# Patient Record
Sex: FEMALE | Race: WHITE | NOT HISPANIC OR LATINO | Employment: FULL TIME | URBAN - METROPOLITAN AREA
[De-identification: names, ages, dates, MRNs, and addresses within clinical notes are randomized per-mention and may not be internally consistent; named-entity substitution may affect disease eponyms.]

---

## 2017-04-04 ENCOUNTER — OFFICE VISIT (OUTPATIENT)
Dept: URGENT CARE | Facility: RETAIL CLINIC | Age: 32
End: 2017-04-04
Payer: COMMERCIAL

## 2017-04-04 VITALS
TEMPERATURE: 99 F | DIASTOLIC BLOOD PRESSURE: 95 MMHG | OXYGEN SATURATION: 99 % | HEART RATE: 87 BPM | SYSTOLIC BLOOD PRESSURE: 134 MMHG

## 2017-04-04 DIAGNOSIS — J01.90 ACUTE SINUSITIS WITH COEXISTING CONDITION, NEED PROPHYLACTIC TREATMENT: Primary | ICD-10-CM

## 2017-04-04 PROCEDURE — 99213 OFFICE O/P EST LOW 20 MIN: CPT | Performed by: PHYSICIAN ASSISTANT

## 2017-04-04 RX ORDER — BIOTIN 10 MG
TABLET ORAL
COMMUNITY
End: 2020-02-19

## 2017-04-04 NOTE — NURSING NOTE
"Chief Complaint   Patient presents with     Otalgia     bilateral ear pain x 1 week, sinus congestion since last wednesday, no fevers     Cough     since last wednesday       Initial BP (!) 134/95  Pulse 87  Temp 99  F (37.2  C) (Temporal)  SpO2 99% Estimated body mass index is 23.21 kg/(m^2) as calculated from the following:    Height as of 9/26/16: 5' 3\" (1.6 m).    Weight as of 11/1/16: 131 lb (59.4 kg).  Medication Reconciliation: complete    "

## 2017-04-04 NOTE — PROGRESS NOTES
Chief Complaint   Patient presents with     Otalgia     bilateral ear pain x 1 week, sinus congestion since last wednesday, no fevers     Cough     since last wednesday     SUBJECTIVE:  Celina Calero is a 31 year old female here with concerns about sinus infection.  She states onset of symptoms was 7 days ago.    Course of illness is same.   Severity moderate  She has had maxillary pressure as well as nasal congestion, cough, ear pain bilaterally and post-nasal drainage  Predisposing factors include taking Humira.  Recent treatment has included: OTC meds- NyQuil    Past Medical History:   Diagnosis Date     Arthritis      Colitis      History of colposcopy with cervical biopsy 12/1/2009    bx- suggestive of koilocytosis.  ECC- negative     LSIL (low grade squamous intraepithelial lesion) on Pap smear 9/10/08     Ulcerative colitis, unspecified     Ulcerative colitis     Current Outpatient Prescriptions   Medication Sig Dispense Refill     Pseudoeph-Doxylamine-DM-APAP (NYQUIL PO)        Multiple Vitamins-Minerals (MULTIVITAMIN ADULT) CHEW        amoxicillin-clavulanate (AUGMENTIN) 875-125 MG per tablet Take 1 tablet by mouth 2 times daily for 10 days 20 tablet 0     etonogestrel-ethinyl estradiol (NUVARING) 0.12-0.015 MG/24HR vaginal ring Place 1 each vaginally every 28 days       adalimumab (HUMIRA) 40 MG/0.8ML injection Inject 40 mg Subcutaneous once       Acetaminophen (TYLENOL PO) Reported on 4/4/2017       Social History   Substance Use Topics     Smoking status: Never Smoker     Smokeless tobacco: Never Used     Alcohol use No     No Known Allergies  ROS:  Review of systems negative except as stated above.    OBJECTIVE:  BP (!) 134/95  Pulse 87  Temp 99  F (37.2  C) (Temporal)  SpO2 99%  GENERAL APPEARANCE: healthy, alert and no distress  EYES: PERRL, conjunctiva clear  HENT: Pain with palpation over frontal and maxillary sinuses. Ear canals normal TMs with mild serous effusions bilaterally. Nasal turbinates  edematous and boggy bilaterally. Posterior pharynx is not erythematous.  NECK: supple, nontender, no lymphadenopathy  RESP: lungs clear to auscultation - no rales, rhonchi or wheezes  CV: regular rates and rhythm, normal S1 S2, no murmur noted    ASSESSMENT:    ICD-10-CM    1. Acute sinusitis with coexisting condition, need prophylactic treatment J01.90 amoxicillin-clavulanate (AUGMENTIN) 875-125 MG per tablet     PLAN:   Patient Instructions   Augmentin (amoxicillin-clavulanate) 875mg twice daily for 10 days as directed.  DayQuil/NyQuil as needed.  Flonase 2 sprays in each nostril daily until symptoms resolve, then continue 1 spray in each nostril for at least 5 more days.  Take Tylenol or an NSAID such as ibuprofen or naproxen as needed for pain.  May use netti pot with bottled or distilled water and saline packets to flush sinuses.  Mucinex (guiafenesin) thins mucus and may help it to loosen more quickly  Saline drops or nasal sprays may loosen mucus.  Sit in the bathroom with the door closed and hot shower running to loosen mucus.  Contact primary care clinic if you do not have any relief from your symptoms after 10 days.  Present to emergency room for significantly increasing pain, persistent high fever >102F, swelling/redness around your eyes, changes in your vision or ability to move your eyes, altered mental status or a severe headache.    Follow up with primary care provider with any problems, questions or concerns or if symptoms worsen or fail to improve. Patient agreed to plan and verbalized understanding.    Andria Peacock PA-C  [facility] [place]

## 2017-04-04 NOTE — PATIENT INSTRUCTIONS
Augmentin (amoxicillin-clavulanate) 875mg twice daily for 10 days as directed.  DayQuil/NyQuil as needed.  Flonase 2 sprays in each nostril daily until symptoms resolve, then continue 1 spray in each nostril for at least 5 more days.  Take Tylenol or an NSAID such as ibuprofen or naproxen as needed for pain.  May use netti pot with bottled or distilled water and saline packets to flush sinuses.  Mucinex (guiafenesin) thins mucus and may help it to loosen more quickly  Saline drops or nasal sprays may loosen mucus.  Sit in the bathroom with the door closed and hot shower running to loosen mucus.  Contact primary care clinic if you do not have any relief from your symptoms after 10 days.  Present to emergency room for significantly increasing pain, persistent high fever >102F, swelling/redness around your eyes, changes in your vision or ability to move your eyes, altered mental status or a severe headache.

## 2017-04-04 NOTE — MR AVS SNAPSHOT
After Visit Summary   4/4/2017    Celina Calero    MRN: [id number]           Patient Information     Date Of Birth          1985        Visit Information        Provider Department      4/4/2017 9:00 AM Silvia Peacock PA-C [facility]        Today's Diagnoses     Acute sinusitis with coexisting condition, need prophylactic treatment    -  1      Care Instructions    Augmentin (amoxicillin-clavulanate) 875mg twice daily for 10 days as directed.  DayQuil/NyQuil as needed.  Flonase 2 sprays in each nostril daily until symptoms resolve, then continue 1 spray in each nostril for at least 5 more days.  Take Tylenol or an NSAID such as ibuprofen or naproxen as needed for pain.  May use netti pot with bottled or distilled water and saline packets to flush sinuses.  Mucinex (guiafenesin) thins mucus and may help it to loosen more quickly  Saline drops or nasal sprays may loosen mucus.  Sit in the bathroom with the door closed and hot shower running to loosen mucus.  Contact primary care clinic if you do not have any relief from your symptoms after 10 days.  Present to emergency room for significantly increasing pain, persistent high fever >102F, swelling/redness around your eyes, changes in your vision or ability to move your eyes, altered mental status or a severe headache.        Follow-ups after your visit        Who to contact     You can reach your care team any time of the day by calling [phone].  Notification of test results:  If you have an abnormal lab result, we will notify you by phone as soon as possible.         Additional Information About Your Visit        MyChart Information     [facility] gives you secure access to your electronic health record. If you see a primary care provider, you can also send messages to your care team and make appointments. If you have questions, please call your primary care clinic.  If you do not have a primary care provider, please call  [phone] and they will assist you.        Care EveryWhere ID     This is your Care EveryWhere ID. This could be used by other organizations to access your [facility] medical records  [id number]        Your Vitals Were     Pulse Temperature Pulse Oximetry             87 99  F (37.2  C) (Temporal) 99%          Blood Pressure from Last 3 Encounters:   04/04/17 (!) 134/95   11/01/16 120/72   09/26/16 154/90    Weight from Last 3 Encounters:   11/01/16 131 lb (59.4 kg)   09/26/16 131 lb (59.4 kg)   01/20/16 130 lb (59 kg)              Today, you had the following     No orders found for display         Today's Medication Changes          These changes are accurate as of: 4/4/17  9:34 AM.  If you have any questions, ask your nurse or doctor.               Start taking these medicines.        Dose/Directions    amoxicillin-clavulanate 875-125 MG per tablet   Commonly known as:  AUGMENTIN   Used for:  Acute sinusitis with coexisting condition, need prophylactic treatment        Dose:  1 tablet   Take 1 tablet by mouth 2 times daily for 10 days   Quantity:  20 tablet   Refills:  0            Where to get your medicines      These medications were sent to [facility] - [address]   [address] [place] [address]     Phone:  [phone]     amoxicillin-clavulanate 875-125 MG per tablet                Primary Care Provider Office Phone # Fax #    Stephanie Benton [phone]       [facility] [address]  [place] [address]        Thank you!     Thank you for choosing [facility]  for your care. Our goal is always to provide you with excellent care. Hearing back from our patients is one way we can continue to improve our services. Please take a few minutes to complete the written survey that you may receive in the mail after your visit with us. Thank you!             Your Updated Medication List - Protect others  around you: Learn how to safely use, store and throw away your medicines at www.disposemymeds.org.          This list is accurate as of: 4/4/17  9:34 AM.  Always use your most recent med list.                   Brand Name Dispense Instructions for use    adalimumab 40 MG/0.8ML injection    Humira     Inject 40 mg Subcutaneous once       amoxicillin-clavulanate 875-125 MG per tablet    AUGMENTIN    20 tablet    Take 1 tablet by mouth 2 times daily for 10 days       etonogestrel-ethinyl estradiol 0.12-0.015 MG/24HR vaginal ring    NUVARING     Place 1 each vaginally every 28 days       MULTIVITAMIN ADULT Chew          NYQUIL PO          TYLENOL PO      Reported on 4/4/2017

## 2017-04-25 ENCOUNTER — TELEPHONE (OUTPATIENT)
Dept: FAMILY MEDICINE | Facility: OTHER | Age: 32
End: 2017-04-25

## 2017-04-25 NOTE — TELEPHONE ENCOUNTER
Summary:    Patient is due/failing the following:   PAP  However if patient completes outside of [facility] please ask when, where and the results to update her records.   Action needed:   Patient needs office visit for PAP.    Type of outreach:    Phone, left message for patient to call back.     Questions for provider review:    None                                                                                                                                    Maryjo Sheets       Chart routed to Care Team .        Panel Management Review      Patient has the following on her problem list: None      Composite cancer screening  Chart review shows that this patient is due/due soon for the following Pap Smear

## 2017-04-25 NOTE — LETTER
[facility]  [address]   [place] [address]  Phone: [phone]  May 1, 2017      Celina Calero  [address] [place] [address]      Dear Celina,    We care about your health and have reviewed your health plan including your medical conditions, medications, and lab results.  Based on this review, it is recommended that you follow up regarding the following health topic(s):  -Cervical Cancer Screening    We recommend you take the following action(s):  -schedule a PAP SMEAR EXAM which is due.  Please disregard this reminder if you have had this exam elsewhere within the last year.  It would be helpful for us to have a copy of your recent pap smear report to update your records.     Please call us at the [facility] - [phone] (or use [employer]) to address the above recommendations.     Thank you for trusting [facility] and we appreciate the opportunity to serve you.  We look forward to supporting your healthcare needs in the future.    Healthy Regards,    Your Health Care Team  [facility]

## 2017-05-26 ENCOUNTER — HEALTH MAINTENANCE LETTER (OUTPATIENT)
Age: 32
End: 2017-05-26

## 2017-08-17 ENCOUNTER — TELEPHONE (OUTPATIENT)
Dept: FAMILY MEDICINE | Facility: OTHER | Age: 32
End: 2017-08-17

## 2017-08-17 NOTE — TELEPHONE ENCOUNTER
Summary:    Patient is due/failing the following:   PAP    Action needed:   Patient needs office visit for PAP.    Type of outreach:    Phone, left message for patient to call back.     Questions for provider review:    None                                                                                                                                    Maryjo Sheets       Chart routed to Care Team .          Panel Management Review      Patient has the following on her problem list: None      Composite cancer screening  Chart review shows that this patient is due/due soon for the following Pap Smear

## 2018-06-01 ENCOUNTER — HEALTH MAINTENANCE LETTER (OUTPATIENT)
Age: 33
End: 2018-06-01

## 2018-11-07 ENCOUNTER — OFFICE VISIT (OUTPATIENT)
Dept: URGENT CARE | Facility: RETAIL CLINIC | Age: 33
End: 2018-11-07
Payer: COMMERCIAL

## 2018-11-07 VITALS — DIASTOLIC BLOOD PRESSURE: 85 MMHG | SYSTOLIC BLOOD PRESSURE: 133 MMHG | HEART RATE: 102 BPM | TEMPERATURE: 99.8 F

## 2018-11-07 DIAGNOSIS — J02.0 STREP THROAT: Primary | ICD-10-CM

## 2018-11-07 DIAGNOSIS — J02.9 ACUTE PHARYNGITIS, UNSPECIFIED ETIOLOGY: ICD-10-CM

## 2018-11-07 LAB — S PYO AG THROAT QL IA.RAPID: POSITIVE

## 2018-11-07 PROCEDURE — 87880 STREP A ASSAY W/OPTIC: CPT | Mod: QW | Performed by: PHYSICIAN ASSISTANT

## 2018-11-07 PROCEDURE — 99213 OFFICE O/P EST LOW 20 MIN: CPT | Performed by: PHYSICIAN ASSISTANT

## 2018-11-07 RX ORDER — PENICILLIN V POTASSIUM 500 MG/1
500 TABLET, FILM COATED ORAL 2 TIMES DAILY
Qty: 20 TABLET | Refills: 0 | Status: SHIPPED | OUTPATIENT
Start: 2018-11-07 | End: 2019-02-06

## 2018-11-07 NOTE — PROGRESS NOTES
Chief Complaint   Patient presents with     Otalgia     bilateral ear pain since yesterday morning, fevers around 101 highest, chills and body aches     Pharyngitis     since yesterday morning     SUBJECTIVE:  Celina Calero is a 33 year old female with a chief complaint of sore throat.  Onset of symptoms was 1 day(s) ago.    Course of illness: sudden onset.  Severity moderate  Current and Associated symptoms: sore throat, ear pain, fever up to 101, chills, body aches  Treatment measures tried include taking tylenol  Predisposing factors include has UC and RA on Humira    Past Medical History:   Diagnosis Date     Arthritis      Colitis      History of colposcopy with cervical biopsy 12/1/2009    bx- suggestive of koilocytosis.  ECC- negative     LSIL (low grade squamous intraepithelial lesion) on Pap smear 9/10/08     Ulcerative colitis, unspecified     Ulcerative colitis     Current Outpatient Prescriptions   Medication Sig Dispense Refill     Acetaminophen (TYLENOL PO) Reported on 4/4/2017       adalimumab (HUMIRA) 40 MG/0.8ML injection Inject 40 mg Subcutaneous once       levonorgestrel (MIRENA) 20 MCG/24HR IUD 1 each by Intrauterine route once       penicillin V potassium (VEETID) 500 MG tablet Take 1 tablet (500 mg) by mouth 2 times daily for 10 days 20 tablet 0     Multiple Vitamins-Minerals (MULTIVITAMIN ADULT) CHEW        Pseudoeph-Doxylamine-DM-APAP (NYQUIL PO)         No Known Allergies     History   Smoking Status     Never Smoker   Smokeless Tobacco     Never Used       ROS:  CONSTITUTIONAL:POSITIVE  for fever, chills, body aches   ENT/MOUTH: POSITIVE for sore throat and ear pain NEGATIVE for nasal congestion  RESP:NEGATIVE for significant cough or wheezing    OBJECTIVE:   /85 (BP Location: Left arm)  Pulse 102  Temp 99.8  F (37.7  C) (Oral)  GENERAL APPEARANCE: mildly ill appearing  EYES: conjunctiva clear  HENT: ear canals and TM's normal.  Nose normal.  Pharynx erythematous with no exudate  noted.  NECK: supple, tender to palpation, small adenopathy noted  RESP: lungs clear to auscultation - no rales, rhonchi or wheezes  CV: regular rates and rhythm, normal S1 S2, no murmur noted  SKIN: no suspicious lesions or rashes    Rapid Strep test is positive    ASSESSMENT:     Acute pharyngitis, unspecified etiology  Strep throat    PLAN:   Plan: penicillin V potassium (VEETID) 500 MG tablet  Take antibiotic as directed and finish entire course.  Change toothbrush after at least 24 hours of starting antibiotics.   Will be contagious for 24 hours after starting antibiotic  May return to work/activities 24 hours after antibiotics are started  Symptomatic treat with fluids, rest, acetaminophen or ibuprofen as needed.   Please follow up with primary care provider if not improving, worsening or new symptoms or for any adverse reactions to medications.      [facility]

## 2018-11-07 NOTE — MR AVS SNAPSHOT
After Visit Summary   11/7/2018    Celina Calero    MRN: [id number]           Patient Information     Date Of Birth          1985        Visit Information        Provider Department      11/7/2018 3:50 PM Madison Teague PA-C [facility]        Today's Diagnoses     Strep throat    -  1    Acute pharyngitis, unspecified etiology          Care Instructions    Take antibiotic as directed and finish entire course.  Change toothbrush after at least 24 hours of starting antibiotics.   Will be contagious for 24 hours after starting antibiotic  May return to work/activities 24 hours after antibiotics are started  Symptomatic treat with fluids, rest, acetaminophen or ibuprofen as needed.   Please follow up with primary care provider if not improving, worsening or new symptoms or for any adverse reactions to medications.            Follow-ups after your visit        Who to contact     You can reach your care team any time of the day by calling [phone].  Notification of test results:  If you have an abnormal lab result, we will notify you by phone as soon as possible.         Additional Information About Your Visit        MyChart Information     [facility] gives you secure access to your electronic health record. If you see a primary care provider, you can also send messages to your care team and make appointments. If you have questions, please call your primary care clinic.  If you do not have a primary care provider, please call [phone] and they will assist you.        Care EveryWhere ID     This is your Care EveryWhere ID. This could be used by other organizations to access your [place] medical records  [id number]        Your Vitals Were     Pulse Temperature                102 99.8  F (37.7  C) (Oral)           Blood Pressure from Last 3 Encounters:   11/07/18 133/85   04/04/17 (!) 134/95   11/01/16 120/72    Weight from Last 3 Encounters:   11/01/16 131 lb (59.4 kg)    09/26/16 131 lb (59.4 kg)   01/20/16 130 lb (59 kg)              We Performed the Following     RAPID STREP SCREEN          Today's Medication Changes          These changes are accurate as of 11/7/18  4:09 PM.  If you have any questions, ask your nurse or doctor.               Start taking these medicines.        Dose/Directions    penicillin V potassium 500 MG tablet   Commonly known as:  VEETID   Used for:  Strep throat        Dose:  500 mg   Take 1 tablet (500 mg) by mouth 2 times daily for 10 days   Quantity:  20 tablet   Refills:  0            Where to get your medicines      These medications were sent to [id number] - [place], [place] - [address]  [address] [facility] [address]     Phone:  [phone]     penicillin V potassium 500 MG tablet                Primary Care Provider Office Phone # Fax #    Stephanie Benton [phone]       [address]  [place] [address]        Equal Access to Services     [facility] CenterYECENIA : Hadii feliciano escobar hadasho Soomaali, waaxda luqadaha, qaybta kaalmada adeegyada, caty singh . So [facility] [phone].    ATENCIÓN: Si habla español, tiene a mars disposición servicios gratuitos de asistencia lingüística. Llame al [phone].    We comply with applicable federal civil rights laws and [place] laws. We do not discriminate on the basis of race, color, national origin, age, disability, sex, sexual orientation, or gender identity.            Thank you!     Thank you for choosing [facility]  for your care. Our goal is always to provide you with excellent care. Hearing back from our patients is one way we can continue to improve our services. Please take a few minutes to complete the written survey that you may receive in the mail after your visit with us. Thank you!             Your Updated Medication List - Protect others around you: Learn how to safely use, store and throw away your  medicines at www.disposemymeds.org.          This list is accurate as of 11/7/18  4:09 PM.  Always use your most recent med list.                   Brand Name Dispense Instructions for use Diagnosis    adalimumab 40 MG/0.8ML injection    Humira     Inject 40 mg Subcutaneous once        levonorgestrel 20 MCG/24HR IUD    MIRENA     1 each by Intrauterine route once        MULTIVITAMIN ADULT Chew           NYQUIL PO           penicillin V potassium 500 MG tablet    VEETID    20 tablet    Take 1 tablet (500 mg) by mouth 2 times daily for 10 days    Strep throat       TYLENOL PO      Reported on 4/4/2017

## 2019-02-06 ENCOUNTER — ANCILLARY PROCEDURE (OUTPATIENT)
Dept: GENERAL RADIOLOGY | Facility: CLINIC | Age: 34
End: 2019-02-06
Attending: PHYSICIAN ASSISTANT
Payer: COMMERCIAL

## 2019-02-06 ENCOUNTER — OFFICE VISIT (OUTPATIENT)
Dept: FAMILY MEDICINE | Facility: CLINIC | Age: 34
End: 2019-02-06
Payer: COMMERCIAL

## 2019-02-06 VITALS
HEIGHT: 63 IN | WEIGHT: 125.6 LBS | DIASTOLIC BLOOD PRESSURE: 80 MMHG | SYSTOLIC BLOOD PRESSURE: 118 MMHG | BODY MASS INDEX: 22.25 KG/M2 | HEART RATE: 75 BPM | RESPIRATION RATE: 14 BRPM | OXYGEN SATURATION: 99 % | TEMPERATURE: 98.4 F

## 2019-02-06 DIAGNOSIS — M47.819 SPONDYLOARTHROPATHY: ICD-10-CM

## 2019-02-06 DIAGNOSIS — K51.90 ULCERATIVE COLITIS WITHOUT COMPLICATIONS, UNSPECIFIED LOCATION (H): ICD-10-CM
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
## 2019-02-06 DIAGNOSIS — S49.90XA SHOULDER INJURY, INITIAL ENCOUNTER: ICD-10-CM

## 2019-02-06 DIAGNOSIS — S49.90XA SHOULDER INJURY, INITIAL ENCOUNTER: Primary | ICD-10-CM

## 2019-02-06 PROCEDURE — 73000 X-RAY EXAM OF COLLAR BONE: CPT | Mod: LT

## 2019-02-06 PROCEDURE — 99214 OFFICE O/P EST MOD 30 MIN: CPT | Performed by: PHYSICIAN ASSISTANT

## 2019-02-06 PROCEDURE — 73030 X-RAY EXAM OF SHOULDER: CPT | Mod: LT

## 2019-02-06 ASSESSMENT — PAIN SCALES - GENERAL: PAINLEVEL: SEVERE PAIN (7)

## 2019-02-06 ASSESSMENT — MIFFLIN-ST. JEOR: SCORE: 1238.85

## 2019-02-06 NOTE — PATIENT INSTRUCTIONS
Acetaminophen 1000mg every 8 hrs as needed  Ice or heat     Gentle stretching    Will call radiology over read and plan if abnormal
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
## 2019-09-28 ENCOUNTER — HEALTH MAINTENANCE LETTER (OUTPATIENT)
Age: 34
End: 2019-09-28

## 2020-02-19 ENCOUNTER — OFFICE VISIT (OUTPATIENT)
Dept: FAMILY MEDICINE | Facility: OTHER | Age: 35
End: 2020-02-19
Payer: COMMERCIAL

## 2020-02-19 VITALS
HEART RATE: 76 BPM | OXYGEN SATURATION: 100 % | TEMPERATURE: 99 F | BODY MASS INDEX: 23.03 KG/M2 | WEIGHT: 130 LBS | SYSTOLIC BLOOD PRESSURE: 124 MMHG | DIASTOLIC BLOOD PRESSURE: 86 MMHG | RESPIRATION RATE: 16 BRPM

## 2020-02-19 DIAGNOSIS — R22.9 SKIN NODULE: ICD-10-CM

## 2020-02-19 DIAGNOSIS — R59.0 POSTERIOR CERVICAL LYMPHADENOPATHY: Primary | ICD-10-CM

## 2020-02-19 PROCEDURE — 99214 OFFICE O/P EST MOD 30 MIN: CPT | Performed by: NURSE PRACTITIONER

## 2020-02-19 RX ORDER — ADALIMUMAB 40MG/0.4ML
0.4 KIT SUBCUTANEOUS
COMMUNITY
Start: 2020-02-18

## 2020-02-19 ASSESSMENT — ENCOUNTER SYMPTOMS
PSYCHIATRIC NEGATIVE: 1
CARDIOVASCULAR NEGATIVE: 1
RESPIRATORY NEGATIVE: 1
CONSTITUTIONAL NEGATIVE: 1
NEUROLOGICAL NEGATIVE: 1

## 2020-02-19 ASSESSMENT — PAIN SCALES - GENERAL: PAINLEVEL: NO PAIN (0)

## 2020-02-19 NOTE — PATIENT INSTRUCTIONS
- Ultrasound as scheduled  - Continue to monitor symptoms.   - If any worsening symptoms, redness, swelling or fevers go in to be seen.       ANAI Odell CNP  Questions or concerns please feel free to send me a [email or web address] message or call me  Phone : [phone]

## 2020-02-19 NOTE — PROGRESS NOTES
Yamil Calero is a 34 year old female who presents to clinic today for the following health issues:    HPI   Lump on back of neck on patient's right.  She noticed this yesterday.  Not really tender but radiates down a little, not red or swollen.    She denies any fevers or chills  Area is tender. No skin changes  No recent illnesses   No recent travel  No URI symptoms or concerns  She has had a couple night sweats  No unintentional weight loss.       Social History     Tobacco Use     Smoking status: Never Smoker     Smokeless tobacco: Never Used   Substance Use Topics     Alcohol use: Yes     Comment: 3 drinks per week         Current Outpatient Medications   Medication Sig Dispense Refill     HUMIRA *CF* PEN 40 MG/0.4ML SC pen kit 0.4 mLs       levonorgestrel (MIRENA) 20 MCG/24HR IUD 1 each by Intrauterine route once       Acetaminophen (TYLENOL PO) Reported on 4/4/2017         Reviewed and updated as needed this visit by Provider         Review of Systems   Constitutional: Negative.    Respiratory: Negative.    Cardiovascular: Negative.    Neurological: Negative.    Psychiatric/Behavioral: Negative.             Objective    /86   Pulse 76   Temp 99  F (37.2  C)   Resp 16   Wt 59 kg (130 lb)   SpO2 100%   BMI 23.03 kg/m    Body mass index is 23.03 kg/m .  Physical Exam  Lymphadenopathy:      Head:      Right side of head: No submental, submandibular, tonsillar, preauricular, posterior auricular or occipital adenopathy.      Left side of head: No submental, submandibular, tonsillar, preauricular, posterior auricular or occipital adenopathy.      Cervical: Cervical adenopathy present.      Right cervical: Posterior cervical adenopathy (tenderness at area small pimple just distal to this region. No redness or skin changes. ) present. No superficial or deep cervical adenopathy.     Left cervical: No superficial, deep or posterior cervical adenopathy.      Upper Body:      Right upper body:  Supraclavicular adenopathy present.   Skin:     Comments: See lymphadenopathy              Diagnostic Test Results:  none         Assessment & Plan       ICD-10-CM    1. Posterior cervical lymphadenopathy R59.0 US Head Neck Soft Tissue   2. Skin nodule R22.9 US Head Neck Soft Tissue     New swelling and lump. Tender and patient very concerned about this. Given location and swelling I am concerned that this could be some lymphadenopathy vs a cyst within the lymph node. I would like her to get an ultra sound. We discussed monitoring for a month and then doing imaging. Patient preferred imaging at this time.     See HPI for pertinent history.     Follow up depending on results.     The patient indicates understanding of these issues and agrees with the plan.    Patient Instructions   - Ultrasound as scheduled  - Continue to monitor symptoms.   - If any worsening symptoms, redness, swelling or fevers go in to be seen.       ANAI Odell CNP  Questions or concerns please feel free to send me a [employer] message or call me  Phone : [phone]          No follow-ups on file.    ANAI Odell CNP  [facility]

## 2020-02-20 ENCOUNTER — HOSPITAL ENCOUNTER (OUTPATIENT)
Dept: ULTRASOUND IMAGING | Facility: CLINIC | Age: 35
Discharge: HOME | End: 2020-02-20
Attending: NURSE PRACTITIONER | Admitting: NURSE PRACTITIONER
Payer: COMMERCIAL

## 2020-02-20 DIAGNOSIS — R22.9 SKIN NODULE: ICD-10-CM

## 2020-02-20 DIAGNOSIS — R59.0 POSTERIOR CERVICAL LYMPHADENOPATHY: ICD-10-CM

## 2020-02-20 PROCEDURE — 76536 US EXAM OF HEAD AND NECK: CPT

## 2020-03-02 ENCOUNTER — MYC MEDICAL ADVICE (OUTPATIENT)
Dept: FAMILY MEDICINE | Facility: OTHER | Age: 35
End: 2020-03-02

## 2020-03-03 NOTE — PROGRESS NOTES
"Subjective     Celina Calero is a 34 year old female who presents to clinic today for the following health issues:    HPI   Concern - pain  Onset: about a week - started a couple days after having a colonoscopy     Description:   Reports a sharp/stabbing pain in left groin, mild pain in right groin, bilateral armpit tenderness, low back pain, and breast tenderness    Intensity: moderate    Progression of Symptoms:  Becoming more consistent     Accompanying Signs & Symptoms:  none    Previous history of similar problem:   yes    Precipitating factors:   Worsened by: none    Alleviating factors:  Improved by: none    Therapies Tried and outcome: none     Saw me last month for lump on the back of her neck  On Saturday she had stabbing pain in the left groin area. Come and go. There is a lump in this area. She had a colonoscopy last Friday.   Sore under her armpits.   Breasts tender.   Low back pain this weekend, this comes and goes.   Current Outpatient Medications   Medication Sig Dispense Refill     Acetaminophen (TYLENOL PO) Reported on 4/4/2017       HUMIRA *CF* PEN 40 MG/0.4ML SC pen kit 0.4 mLs       levonorgestrel (MIRENA) 20 MCG/24HR IUD 1 each by Intrauterine route once         Reviewed and updated as needed this visit by Provider         Review of Systems         Objective    /82 (BP Location: Right arm, Patient Position: Chair, Cuff Size: Adult Regular)   Pulse 85   Temp 99  F (37.2  C) (Temporal)   Resp 16   Ht 1.6 m (5' 3\")   Wt 58.1 kg (128 lb)   SpO2 99%   BMI 22.67 kg/m    Body mass index is 22.67 kg/m .  Physical Exam  Cardiovascular:      Rate and Rhythm: Normal rate and regular rhythm.   Pulmonary:      Effort: Pulmonary effort is normal.      Breath sounds: Normal breath sounds and air entry.   Lymphadenopathy:      Head:      Right side of head: No submental, submandibular, tonsillar, preauricular, posterior auricular or occipital adenopathy.      Left side of head: No submental, " submandibular, tonsillar, preauricular, posterior auricular or occipital adenopathy.      Cervical: No cervical adenopathy.      Right cervical: No superficial, deep or posterior cervical adenopathy.     Left cervical: No superficial, deep or posterior cervical adenopathy.      Upper Body:      Right upper body: No supraclavicular or axillary adenopathy.      Left upper body: No supraclavicular or axillary adenopathy.      Lower Body: Left inguinal adenopathy (small lump along the left inguinal region) present.   Skin:     General: Skin is warm.   Neurological:      Mental Status: She is alert.   Psychiatric:         Behavior: Behavior is cooperative.            Diagnostic Test Results:  none         Assessment & Plan       ICD-10-CM    1. Pelvic lymphadenopathy  R59.0 *UA reflex to Microscopic and Culture ([place] [facility] (except [place] and [place])     CBC with platelets and differential     US Pelvic Complete w Transvaginal     US Extremity Non Vascular Bilateral   2. Armpit pain, unspecified laterality  M79.629    3. Inguinal lymphadenopathy  R59.0 US Extremity Non Vascular Bilateral      Patient here today with concerns for some inguinal lymphadenopathy and pelvic pain. She notes that she developed this last Saturday and noticed the lump as well. Had a colonoscopy last Friday. She is also has had some soreness under her armpits. Overall concerned if this is an acute illness or additional concerns. She denies any fever or chills. UA is essentially normal. Denies any vaginal discharge. CBC normal. At this time we discussed monitoring vs imaging evaluation.   Imaging requested, will get this. I recommend she continue to monitor her symptoms, certainly if any worsening symptoms she return to clinic or go in to be evaluated.   The patient indicates understanding of these issues and agrees with the plan.        The patient indicates understanding of these issues and agrees with the plan.    Patient  Instructions   - Plans for imaging  - Recommend follow up depending on results  - If any worsening symptoms or concerns please go in to be evaluated.       ANAI Odell CNP  Questions or concerns please feel free to send me a [employer] message or call me  Phone : [phone]          No follow-ups on file.    ANAI Odell CNP  [facility]

## 2020-03-04 ENCOUNTER — OFFICE VISIT (OUTPATIENT)
Dept: FAMILY MEDICINE | Facility: OTHER | Age: 35
End: 2020-03-04
Payer: COMMERCIAL

## 2020-03-04 VITALS
BODY MASS INDEX: 22.68 KG/M2 | SYSTOLIC BLOOD PRESSURE: 124 MMHG | OXYGEN SATURATION: 99 % | HEIGHT: 63 IN | TEMPERATURE: 99 F | WEIGHT: 128 LBS | RESPIRATION RATE: 16 BRPM | DIASTOLIC BLOOD PRESSURE: 82 MMHG | HEART RATE: 85 BPM

## 2020-03-04 DIAGNOSIS — M79.629 ARMPIT PAIN, UNSPECIFIED LATERALITY: ICD-10-CM

## 2020-03-04 DIAGNOSIS — R59.0 PELVIC LYMPHADENOPATHY: Primary | ICD-10-CM

## 2020-03-04 DIAGNOSIS — R59.0 INGUINAL LYMPHADENOPATHY: ICD-10-CM

## 2020-03-04 LAB
ALBUMIN UR-MCNC: NEGATIVE MG/DL
APPEARANCE UR: CLEAR
BASOPHILS # BLD AUTO: 0 10E9/L (ref 0–0.2)
BASOPHILS NFR BLD AUTO: 0.3 %
BILIRUB UR QL STRIP: NEGATIVE
COLOR UR AUTO: YELLOW
DIFFERENTIAL METHOD BLD: NORMAL
EOSINOPHIL # BLD AUTO: 0.2 10E9/L (ref 0–0.7)
EOSINOPHIL NFR BLD AUTO: 2.3 %
ERYTHROCYTE [DISTWIDTH] IN BLOOD BY AUTOMATED COUNT: 12.6 % (ref 10–15)
GLUCOSE UR STRIP-MCNC: NEGATIVE MG/DL
HCT VFR BLD AUTO: 39.7 % (ref 35–47)
HGB BLD-MCNC: 13.1 G/DL (ref 11.7–15.7)
HGB UR QL STRIP: NEGATIVE
KETONES UR STRIP-MCNC: NEGATIVE MG/DL
LEUKOCYTE ESTERASE UR QL STRIP: NEGATIVE
LYMPHOCYTES # BLD AUTO: 3 10E9/L (ref 0.8–5.3)
LYMPHOCYTES NFR BLD AUTO: 29.2 %
MCH RBC QN AUTO: 30.2 PG (ref 26.5–33)
MCHC RBC AUTO-ENTMCNC: 33 G/DL (ref 31.5–36.5)
MCV RBC AUTO: 92 FL (ref 78–100)
MONOCYTES # BLD AUTO: 1 10E9/L (ref 0–1.3)
MONOCYTES NFR BLD AUTO: 9.5 %
NEUTROPHILS # BLD AUTO: 6.1 10E9/L (ref 1.6–8.3)
NEUTROPHILS NFR BLD AUTO: 58.7 %
NITRATE UR QL: NEGATIVE
PH UR STRIP: 7 PH (ref 5–7)
PLATELET # BLD AUTO: 269 10E9/L (ref 150–450)
RBC # BLD AUTO: 4.34 10E12/L (ref 3.8–5.2)
SOURCE: NORMAL
SP GR UR STRIP: 1.01 (ref 1–1.03)
UROBILINOGEN UR STRIP-ACNC: 0.2 EU/DL (ref 0.2–1)
WBC # BLD AUTO: 10.3 10E9/L (ref 4–11)

## 2020-03-04 PROCEDURE — 81003 URINALYSIS AUTO W/O SCOPE: CPT | Performed by: NURSE PRACTITIONER

## 2020-03-04 PROCEDURE — 36415 COLL VENOUS BLD VENIPUNCTURE: CPT | Performed by: NURSE PRACTITIONER

## 2020-03-04 PROCEDURE — 99214 OFFICE O/P EST MOD 30 MIN: CPT | Performed by: NURSE PRACTITIONER

## 2020-03-04 PROCEDURE — 85025 COMPLETE CBC W/AUTO DIFF WBC: CPT | Performed by: NURSE PRACTITIONER

## 2020-03-04 ASSESSMENT — MIFFLIN-ST. JEOR: SCORE: 1244.73

## 2020-03-04 NOTE — PATIENT INSTRUCTIONS
- Plans for imaging  - Recommend follow up depending on results  - If any worsening symptoms or concerns please go in to be evaluated.       ANAI Odell CNP  Questions or concerns please feel free to send me a [employer] message or call me  Phone : [phone]

## 2020-03-05 ENCOUNTER — ANCILLARY PROCEDURE (OUTPATIENT)
Dept: ULTRASOUND IMAGING | Facility: CLINIC | Age: 35
End: 2020-03-05
Attending: NURSE PRACTITIONER
Payer: COMMERCIAL

## 2020-03-05 DIAGNOSIS — R59.0 PELVIC LYMPHADENOPATHY: ICD-10-CM

## 2020-03-05 DIAGNOSIS — R59.0 INGUINAL LYMPHADENOPATHY: ICD-10-CM

## 2020-03-05 PROCEDURE — 76882 US LMTD JT/FCL EVL NVASC XTR: CPT | Mod: RT

## 2020-03-05 PROCEDURE — 76830 TRANSVAGINAL US NON-OB: CPT

## 2020-03-05 PROCEDURE — 76856 US EXAM PELVIC COMPLETE: CPT | Mod: 59

## 2020-08-20 ENCOUNTER — VIRTUAL VISIT (OUTPATIENT)
Dept: FAMILY MEDICINE | Facility: OTHER | Age: 35
End: 2020-08-20
Payer: COMMERCIAL

## 2020-08-20 DIAGNOSIS — Z20.822 SUSPECTED 2019 NOVEL CORONAVIRUS INFECTION: Primary | ICD-10-CM

## 2020-08-20 DIAGNOSIS — Z20.822 SUSPECTED 2019 NOVEL CORONAVIRUS INFECTION: ICD-10-CM

## 2020-08-20 PROCEDURE — 99421 OL DIG E/M SVC 5-10 MIN: CPT | Performed by: PHYSICIAN ASSISTANT

## 2020-08-20 PROCEDURE — U0003 INFECTIOUS AGENT DETECTION BY NUCLEIC ACID (DNA OR RNA); SEVERE ACUTE RESPIRATORY SYNDROME CORONAVIRUS 2 (SARS-COV-2) (CORONAVIRUS DISEASE [COVID-19]), AMPLIFIED PROBE TECHNIQUE, MAKING USE OF HIGH THROUGHPUT TECHNOLOGIES AS DESCRIBED BY CMS-2020-01-R: HCPCS | Performed by: FAMILY MEDICINE

## 2020-08-20 NOTE — PROGRESS NOTES
"Date: 2020 08:55:52  Clinician: Ricardo Brooks  Clinician NPI: [id number]  Patient: Celina Calero  Patient : 1985  Patient Address: [address] [place] [address]  Patient Phone: [phone]  Visit Protocol: URI  Patient Summary:  Celina is a 34 year old ( : 1985 ) female who initiated a Visit for COVID-19 (Coronavirus) evaluation and screening. When asked the question \"Please sign me up to receive news, health information and promotions from [employer]", Celina responded \"No\".    Celina states her symptoms started 1-2 days ago.   Her symptoms consist of ear pain, malaise, a sore throat, a cough, and nasal congestion.   Symptom details     Nasal secretions: The color of her mucus is white.    Cough: Celina coughs a few times an hour and her cough is not more bothersome at night. Phlegm comes into her throat when she coughs. She believes her cough is caused by post-nasal drip. The color of the phlegm is clear.     Sore throat: Celina reports having mild throat pain (1-3 on a 10 point pain scale), does not have exudate on her tonsils, and can swallow liquids. The lymph nodes in her neck are not enlarged. A rash has not appeared on the skin since the sore throat started.      Celina denies having headache, chills, enlarged lymph nodes, fever, wheezing, teeth pain, ageusia, diarrhea, vomiting, rhinitis, nausea, myalgias, anosmia, and facial pain or pressure. She also denies having recent facial or sinus surgery in the past 60 days and taking antibiotic medication in the past month. She is not experiencing dyspnea.   Precipitating events  Within the past week, Celina has not been exposed to someone with strep throat. She has not recently been exposed to someone with influenza. Celina has been in close contact with the following high risk individuals: people with asthma, heart disease or diabetes.   Pertinent COVID-19 (Coronavirus) information  In the past 14 days, Celina has not worked in a congregate living " setting.   She does not work or volunteer as healthcare worker or a  and does not work or volunteer in a healthcare facility.   Celina also has not lived in a congregate living setting in the past 14 days. She does not live with a healthcare worker.   Celina has had a close contact with a laboratory-confirmed COVID-19 patient within 14 days of symptom onset.   Since December 2019, Celina and has not had upper respiratory infection or influenza-like illness. Has not been diagnosed with lab-confirmed COVID-19 test   Pertinent medical history  Celina does not get yeast infections when she takes antibiotics.   Celina needs a return to work/school note.   Weight: 123 lbs   Celina does not smoke or use smokeless tobacco.   She denies pregnancy and denies breastfeeding. She does not menstruate.   Additional information as reported by the patient (free text): I have RA and colitis - my last dose of humira was this past Saturday.   Weight: 123 lbs    MEDICATIONS: Humira Pen subcutaneous, ALLERGIES: NKDA  Clinician Response:  Dear Celina,   Your symptoms show that you may have coronavirus (COVID-19). This illness can cause fever, cough and trouble breathing. Many people get a mild case and get better on their own. Some people can get very sick.  What should I do?  We would like to test you for this virus.   1. Please call [phone] to schedule your visit. Explain that you were referred by [facility] to have a COVID-19 test. Be ready to share [facility].  The following will serve as your written order for this COVID Test, ordered by me, for the indication of suspected COVID [Z20.828]: The test will be ordered in [employer], our electronic health record, after you are scheduled. It will show as ordered and authorized by Brian Mendoza MD.  Order: COVID-19 (Coronavirus) PCR for SYMPTOMATIC testing from [facility].      2. When it's time for your COVID test:  Stay at least 6 feet away from others. (If someone will drive you  "to your test, stay in the backseat, as far away from the  as you can.)   Cover your mouth and nose with a mask, tissue or washcloth.  Go straight to the testing site. Don't make any stops on the way there or back.      3.Starting now: Stay home and away from others (self-isolate) until:   You've had no fever---and no medicine that reduces fever---for one full day (24 hours). And...   Your other symptoms have gotten better. For example, your cough or breathing has improved. And...   At least 10 days have passed since your symptoms started.       During this time, don't leave the house except for testing or medical care.   Stay in your own room, even for meals. Use your own bathroom if you can.   Stay away from others in your home. No hugging, kissing or shaking hands. No visitors.  Don't go to work, school or anywhere else.    Clean \"high touch\" surfaces often (doorknobs, counters, handles, etc.). Use a household cleaning spray or wipes. You'll find a full list of  on the EPA website: www.epa.gov/pesticide-registration/list-n-disinfectants-use-against-sars-cov-2.   Cover your mouth and nose with a mask, tissue or washcloth to avoid spreading germs.  Wash your hands and face often. Use soap and water.  Caregivers in these groups are at risk for severe illness due to COVID-19:  o People 65 years and older  o People who live in a nursing home or long-term care facility  o People with chronic disease (lung, heart, cancer, diabetes, kidney, liver, immunologic)  o People who have a weakened immune system, including those who:   Are in cancer treatment  Take medicine that weakens the immune system, such as corticosteroids  Had a bone marrow or organ transplant  Have an immune deficiency  Have poorly controlled HIV or AIDS  Are obese (body mass index of 40 or higher)  Smoke regularly   o Caregivers should wear gloves while washing dishes, handling laundry and cleaning bedrooms and bathrooms.  o Use caution when " washing and drying laundry: Don't shake dirty laundry, and use the warmest water setting that you can.  o For more tips, go to www.cdc.gov/coronavirus/2019-ncov/downloads/10Things.pdf.    4.Sign up for [employer]. We know it's scary to hear that you might have COVID-19. We want to track your symptoms to make sure you're okay over the next 2 weeks. Please look for an email from [employer]---this is a free, online program that we'll use to keep in touch. To sign up, follow the link in the email. Learn more at [email or web address]  How can I take care of myself?   Get lots of rest. Drink extra fluids (unless a doctor has told you not to).   Take Tylenol (acetaminophen) for fever or pain. If you have liver or kidney problems, ask your family doctor if it's okay to take Tylenol.   Adults can take either:    650 mg (two 325 mg pills) every 4 to 6 hours, or...   1,000 mg (two 500 mg pills) every 8 hours as needed.    Note: Don't take more than 3,000 mg in one day. Acetaminophen is found in many medicines (both prescribed and over-the-counter medicines). Read all labels to be sure you don't take too much.   For children, check the Tylenol bottle for the right dose. The dose is based on the child's age or weight.    If you have other health problems (like cancer, heart failure, an organ transplant or severe kidney disease): Call your specialty clinic if you don't feel better in the next 2 days.       Know when to call 911. Emergency warning signs include:    Trouble breathing or shortness of breath Pain or pressure in the chest that doesn't go away Feeling confused like you haven't felt before, or not being able to wake up Bluish-colored lips or face.  Where can I get more information?    [facility] -- About COVID-19: [email or web address]   CDC -- What to Do If You're Sick: www.cdc.gov/coronavirus/2019-ncov/about/steps-when-sick.html   CDC -- Ending Home Isolation:  www.cdc.gov/coronavirus/2019-ncov/hcp/disposition-in-home-patients.html   [facility] -- Caring for Someone: www.cdc.gov/coronavirus/2019-ncov/if-you-are-sick/care-for-someone.html   [facility] -- Interim Guidance for Hospital Discharge to Home: www.[facility].[place]/diseases/coronavirus/hcp/hospdischarge.pdf   [facility] clinical trials (COVID-19 research studies): clinicalaffairs.[facility]-clinical-trials    Below are the COVID-19 hotlines at the [place] Department of [facility] ([facility]). Interpreters are available.    For health questions: Call 027-494-2106 or 1-443.180.3079 (7 a.m. to 7 p.m.) For questions about schools and childcare: Call 788-296-4555 or 1-671.960.4209 (7 a.m. to 7 p.m.)    Diagnosis: Cough  Diagnosis ICD: R05

## 2020-08-21 LAB
SARS-COV-2 RNA SPEC QL NAA+PROBE: NOT DETECTED
SPECIMEN SOURCE: NORMAL

## 2021-01-09 ENCOUNTER — HEALTH MAINTENANCE LETTER (OUTPATIENT)
Age: 36
End: 2021-01-09

## 2021-10-23 ENCOUNTER — HEALTH MAINTENANCE LETTER (OUTPATIENT)
Age: 36
End: 2021-10-23

## 2022-02-12 ENCOUNTER — HEALTH MAINTENANCE LETTER (OUTPATIENT)
Age: 37
End: 2022-02-12

## 2022-10-09 ENCOUNTER — HEALTH MAINTENANCE LETTER (OUTPATIENT)
Age: 37
End: 2022-10-09

## 2023-02-18 ENCOUNTER — HEALTH MAINTENANCE LETTER (OUTPATIENT)
Age: 38
End: 2023-02-18

## 2024-03-16 ENCOUNTER — HEALTH MAINTENANCE LETTER (OUTPATIENT)
Age: 39
End: 2024-03-16

## 2024-04-30 ENCOUNTER — LAB REQUISITION (OUTPATIENT)
Dept: LAB | Facility: CLINIC | Age: 39
End: 2024-04-30

## 2024-04-30 DIAGNOSIS — Z01.419 ENCOUNTER FOR GYNECOLOGICAL EXAMINATION (GENERAL) (ROUTINE) WITHOUT ABNORMAL FINDINGS: ICD-10-CM

## 2024-04-30 PROCEDURE — 87624 HPV HI-RISK TYP POOLED RSLT: CPT | Performed by: NURSE PRACTITIONER

## 2024-04-30 PROCEDURE — G0145 SCR C/V CYTO,THINLAYER,RESCR: HCPCS | Performed by: NURSE PRACTITIONER

## 2024-05-03 LAB
BKR LAB AP GYN ADEQUACY: NORMAL
BKR LAB AP GYN INTERPRETATION: NORMAL
BKR LAB AP HPV REFLEX: NORMAL
BKR LAB AP LMP: NORMAL
BKR LAB AP PREVIOUS ABNL DX: NORMAL
BKR LAB AP PREVIOUS ABNORMAL: NORMAL
PATH REPORT.COMMENTS IMP SPEC: NORMAL
PATH REPORT.COMMENTS IMP SPEC: NORMAL
PATH REPORT.RELEVANT HX SPEC: NORMAL

## 2024-05-08 LAB
HUMAN PAPILLOMA VIRUS 16 DNA: NEGATIVE
HUMAN PAPILLOMA VIRUS 18 DNA: NEGATIVE
HUMAN PAPILLOMA VIRUS FINAL DIAGNOSIS: NORMAL
HUMAN PAPILLOMA VIRUS OTHER HR: NEGATIVE

## 2024-10-25 ENCOUNTER — IMMUNIZATION (OUTPATIENT)
Dept: FAMILY MEDICINE | Facility: OTHER | Age: 39
End: 2024-10-25
Payer: COMMERCIAL

## 2024-10-25 PROCEDURE — 90656 IIV3 VACC NO PRSV 0.5 ML IM: CPT

## 2024-10-25 PROCEDURE — 90471 IMMUNIZATION ADMIN: CPT

## 2025-02-20 ENCOUNTER — TELEPHONE (OUTPATIENT)
Dept: FAMILY MEDICINE | Facility: OTHER | Age: 40
End: 2025-02-20
Payer: COMMERCIAL

## 2025-02-20 NOTE — TELEPHONE ENCOUNTER
Reason for Call:  Appointment Request    Patient requesting this type of appt:  Labs    Requested provider:  Lab appt    Reason patient unable to be scheduled: Not within requested timeframe    When does patient want to be seen/preferred time:  Pt requesting an earlier time    Comments: Pt has lab appt scheduled on 2/24/25 at 1:30.  She is wanting to know if there is anyway to get a morning appointment.     Could we send this information to you in [employer] or would you prefer to receive a phone call?:   No preference   Okay to leave a detailed message?: Yes at Cell number on file:    Telephone Information:   Mobile [phone]       Call taken on 2/20/2025 at 10:20 AM by Russell Tong

## 2025-02-24 ENCOUNTER — LAB (OUTPATIENT)
Dept: LAB | Facility: OTHER | Age: 40
End: 2025-02-24
Payer: COMMERCIAL

## 2025-02-24 DIAGNOSIS — K51.00 ULCERATIVE (CHRONIC) ENTEROCOLITIS (H): ICD-10-CM

## 2025-02-24 DIAGNOSIS — M45.9 ANKYLOSING SPONDYLITIS (H): ICD-10-CM

## 2025-02-24 LAB
BASOPHILS # BLD AUTO: 0 10E3/UL (ref 0–0.2)
BASOPHILS NFR BLD AUTO: 0 %
CRP SERPL-MCNC: <3 MG/L
EOSINOPHIL # BLD AUTO: 0.3 10E3/UL (ref 0–0.7)
EOSINOPHIL NFR BLD AUTO: 4 %
ERYTHROCYTE [DISTWIDTH] IN BLOOD BY AUTOMATED COUNT: 12.1 % (ref 10–15)
HCT VFR BLD AUTO: 41.3 % (ref 35–47)
HGB BLD-MCNC: 13.9 G/DL (ref 11.7–15.7)
IMM GRANULOCYTES # BLD: 0 10E3/UL
IMM GRANULOCYTES NFR BLD: 0 %
LYMPHOCYTES # BLD AUTO: 1.9 10E3/UL (ref 0.8–5.3)
LYMPHOCYTES NFR BLD AUTO: 28 %
MCH RBC QN AUTO: 30.8 PG (ref 26.5–33)
MCHC RBC AUTO-ENTMCNC: 33.7 G/DL (ref 31.5–36.5)
MCV RBC AUTO: 92 FL (ref 78–100)
MONOCYTES # BLD AUTO: 0.7 10E3/UL (ref 0–1.3)
MONOCYTES NFR BLD AUTO: 11 %
NEUTROPHILS # BLD AUTO: 3.8 10E3/UL (ref 1.6–8.3)
NEUTROPHILS NFR BLD AUTO: 57 %
PLATELET # BLD AUTO: 251 10E3/UL (ref 150–450)
RBC # BLD AUTO: 4.51 10E6/UL (ref 3.8–5.2)
WBC # BLD AUTO: 6.7 10E3/UL (ref 4–11)

## 2025-02-24 PROCEDURE — 86140 C-REACTIVE PROTEIN: CPT

## 2025-02-24 PROCEDURE — 80145 DRUG ASSAY ADALIMUMAB: CPT | Mod: 90

## 2025-02-24 PROCEDURE — 36415 COLL VENOUS BLD VENIPUNCTURE: CPT

## 2025-02-24 PROCEDURE — 85025 COMPLETE CBC W/AUTO DIFF WBC: CPT

## 2025-02-24 PROCEDURE — 82306 VITAMIN D 25 HYDROXY: CPT

## 2025-02-24 PROCEDURE — 99000 SPECIMEN HANDLING OFFICE-LAB: CPT

## 2025-02-24 PROCEDURE — 82542 COL CHROMOTOGRAPHY QUAL/QUAN: CPT | Mod: 90

## 2025-02-26 LAB
DEPRECATED CALCIDIOL+CALCIFEROL SERPL-MC: <53 UG/L (ref 20–75)
VITAMIN D2 SERPL-MCNC: <5 UG/L
VITAMIN D3 SERPL-MCNC: 48 UG/L

## 2025-02-27 LAB
ADALIMUMAB AB SERPL IA-MCNC: 24.5 U/ML
ADALIMUMAB SERPL-MCNC: <1.6 UG/ML